# Patient Record
Sex: FEMALE | Race: WHITE | URBAN - METROPOLITAN AREA
[De-identification: names, ages, dates, MRNs, and addresses within clinical notes are randomized per-mention and may not be internally consistent; named-entity substitution may affect disease eponyms.]

---

## 2021-09-02 ENCOUNTER — LAB OUTSIDE AN ENCOUNTER (OUTPATIENT)
Dept: URBAN - METROPOLITAN AREA CLINIC 23 | Facility: CLINIC | Age: 26
End: 2021-09-02

## 2021-09-02 ENCOUNTER — OFFICE VISIT (OUTPATIENT)
Dept: URBAN - METROPOLITAN AREA CLINIC 23 | Facility: CLINIC | Age: 26
End: 2021-09-02
Payer: COMMERCIAL

## 2021-09-02 ENCOUNTER — WEB ENCOUNTER (OUTPATIENT)
Dept: URBAN - METROPOLITAN AREA CLINIC 23 | Facility: CLINIC | Age: 26
End: 2021-09-02

## 2021-09-02 VITALS
SYSTOLIC BLOOD PRESSURE: 121 MMHG | DIASTOLIC BLOOD PRESSURE: 66 MMHG | WEIGHT: 143 LBS | TEMPERATURE: 97.5 F | HEART RATE: 70 BPM | HEIGHT: 63 IN | BODY MASS INDEX: 25.34 KG/M2

## 2021-09-02 DIAGNOSIS — R19.7 DIARRHEA: ICD-10-CM

## 2021-09-02 DIAGNOSIS — R10.13 DYSPEPSIA: ICD-10-CM

## 2021-09-02 DIAGNOSIS — R11.2 NAUSEA AND VOMITING: ICD-10-CM

## 2021-09-02 DIAGNOSIS — R13.10 DYSPHAGIA: ICD-10-CM

## 2021-09-02 DIAGNOSIS — R14.0 ABDOMINAL BLOATING: ICD-10-CM

## 2021-09-02 DIAGNOSIS — R10.10 UPPER ABDOMINAL PAIN: ICD-10-CM

## 2021-09-02 DIAGNOSIS — K62.5 RECTAL BLEEDING: ICD-10-CM

## 2021-09-02 PROCEDURE — 99244 OFF/OP CNSLTJ NEW/EST MOD 40: CPT | Performed by: INTERNAL MEDICINE

## 2021-09-02 RX ORDER — ONDANSETRON 4 MG/1
1 TABLET ON THE TONGUE AND ALLOW TO DISSOLVE TABLET, ORALLY DISINTEGRATING ORAL ONCE A DAY
Status: ACTIVE | COMMUNITY

## 2021-09-02 RX ORDER — OMEPRAZOLE 40 MG/1
1 CAPSULE 30 MINUTES BEFORE MORNING MEAL CAPSULE, DELAYED RELEASE ORAL ONCE A DAY
Status: ACTIVE | COMMUNITY

## 2021-09-02 NOTE — HPI-TODAY'S VISIT:
- 24 yo  female, referred by Dr. Re Sabillon for further evaluation of GI complaints as detailed below, which have been ongoing for approximately 3 months.  A copy of this note will be sent to the referring physician. - Epigastric pain which is constant, varies between a pressure sensation and stabbing, and radiates to her back straight through.  Most of the day it is 3/10 in intensity but may worsen to 6/10 in intensity.  The pain is worsened after eating; it feels like the inside of her stomach is trying to eat itself.   - Associated symptoms include nausea and vomiting every 3 days 2-3 times on the day that she gets the symptoms.  Also intermittent dyspepsia, dysphagia, and abdominal bloating.  Has been taking PRN Zofran which helps.  Has also been taking omeprazole 40 mg daily since last week, which is helping. - Does not take ASA or NSAIDs - Has constipation alternating with diarrhea.  Constipation is more predominant; diarrhea only 1-2 days per week.  Rare rectal bleeding about once a month, with blood staining the toilet paper. - Had negative bloodwork done at PCPs office last month.  States her PCP ordered a RUQ ultrasound and she is scheduled to get it done next week. - Denies family history of GI malignancies in any first degree relatives

## 2021-09-13 PROBLEM — 40739000 DYSPHAGIA: Status: ACTIVE | Noted: 2021-09-02

## 2021-09-24 ENCOUNTER — OFFICE VISIT (OUTPATIENT)
Dept: URBAN - METROPOLITAN AREA SURGERY CENTER 15 | Facility: SURGERY CENTER | Age: 26
End: 2021-09-24
Payer: COMMERCIAL

## 2021-09-24 ENCOUNTER — CLAIMS CREATED FROM THE CLAIM WINDOW (OUTPATIENT)
Dept: URBAN - METROPOLITAN AREA CLINIC 4 | Facility: CLINIC | Age: 26
End: 2021-09-24
Payer: COMMERCIAL

## 2021-09-24 DIAGNOSIS — R19.7 ACUTE DIARRHEA: ICD-10-CM

## 2021-09-24 DIAGNOSIS — K29.60 OTHER GASTRITIS WITHOUT BLEEDING: ICD-10-CM

## 2021-09-24 DIAGNOSIS — K62.5 ANAL BLEEDING: ICD-10-CM

## 2021-09-24 DIAGNOSIS — K29.60 ADENOPAPILLOMATOSIS GASTRICA: ICD-10-CM

## 2021-09-24 DIAGNOSIS — K63.89 POLYP, HYPERPLASTIC: ICD-10-CM

## 2021-09-24 DIAGNOSIS — B96.81 BACTERIAL INFECTION DUE TO H. PYLORI: ICD-10-CM

## 2021-09-24 DIAGNOSIS — B96.81 HELICOBACTER PYLORI [H. PYLORI] AS THE CAUSE OF DISEASES CLASSIFIED ELSEWHERE: ICD-10-CM

## 2021-09-24 DIAGNOSIS — K21.9 ACID REFLUX: ICD-10-CM

## 2021-09-24 DIAGNOSIS — K21.9 GASTRO-ESOPHAGEAL REFLUX DISEASE WITHOUT ESOPHAGITIS: ICD-10-CM

## 2021-09-24 DIAGNOSIS — K31.89 NONSURGICAL DUMPING SYNDROME: ICD-10-CM

## 2021-09-24 PROCEDURE — G8907 PT DOC NO EVENTS ON DISCHARG: HCPCS | Performed by: INTERNAL MEDICINE

## 2021-09-24 PROCEDURE — 88342 IMHCHEM/IMCYTCHM 1ST ANTB: CPT | Performed by: PATHOLOGY

## 2021-09-24 PROCEDURE — 88312 SPECIAL STAINS GROUP 1: CPT | Performed by: PATHOLOGY

## 2021-09-24 PROCEDURE — 88305 TISSUE EXAM BY PATHOLOGIST: CPT | Performed by: PATHOLOGY

## 2021-09-24 PROCEDURE — 45380 COLONOSCOPY AND BIOPSY: CPT | Performed by: INTERNAL MEDICINE

## 2021-09-24 PROCEDURE — 43239 EGD BIOPSY SINGLE/MULTIPLE: CPT | Performed by: INTERNAL MEDICINE

## 2021-09-24 RX ORDER — OMEPRAZOLE 40 MG/1
1 CAPSULE 30 MINUTES BEFORE MORNING MEAL CAPSULE, DELAYED RELEASE ORAL ONCE A DAY
Status: ACTIVE | COMMUNITY

## 2021-09-24 RX ORDER — ONDANSETRON 4 MG/1
1 TABLET ON THE TONGUE AND ALLOW TO DISSOLVE TABLET, ORALLY DISINTEGRATING ORAL ONCE A DAY
Status: ACTIVE | COMMUNITY

## 2021-10-07 ENCOUNTER — OFFICE VISIT (OUTPATIENT)
Dept: URBAN - METROPOLITAN AREA CLINIC 33 | Facility: CLINIC | Age: 26
End: 2021-10-07

## 2021-10-07 NOTE — HPI-MIGRATED HPI
;     Vomitting : Patient presents today for consultation of severe weight loss and vomiting.  Onset was __ and course has been persistent.  Patient describes their -- .    Patient admits/denies any recent labs, scans, or other examinations.;

## 2021-10-17 ENCOUNTER — TELEPHONE ENCOUNTER (OUTPATIENT)
Dept: URBAN - METROPOLITAN AREA CLINIC 92 | Facility: CLINIC | Age: 26
End: 2021-10-17

## 2021-10-17 RX ORDER — AMOXICILLIN 500 MG/1
2 TABLETS TABLET, FILM COATED ORAL TWICE A DAY
Qty: 56 TABLETS | Refills: 0 | OUTPATIENT
Start: 2021-10-17 | End: 2021-10-31

## 2021-10-17 RX ORDER — CLARITHROMYCIN 500 MG/1
1 TABLET TABLET, FILM COATED ORAL
Qty: 28 TABLETS | Refills: 0 | OUTPATIENT
Start: 2021-10-17 | End: 2021-10-31

## 2021-10-17 RX ORDER — OMEPRAZOLE 40 MG/1
1 CAPSULE 30 MINUTES BEFORE MORNING MEAL CAPSULE, DELAYED RELEASE ORAL ONCE A DAY
OUTPATIENT

## 2021-10-17 RX ORDER — OMEPRAZOLE 20 MG/1
1 CAPSULE 30 MINUTES BEFORE BREAKFAST AND DINNER CAPSULE, DELAYED RELEASE ORAL TWICE A DAY
Qty: 28 | Refills: 0 | OUTPATIENT
Start: 2021-10-17

## 2021-12-16 ENCOUNTER — DASHBOARD ENCOUNTERS (OUTPATIENT)
Age: 26
End: 2021-12-16

## 2021-12-16 ENCOUNTER — OFFICE VISIT (OUTPATIENT)
Dept: URBAN - METROPOLITAN AREA CLINIC 23 | Facility: CLINIC | Age: 26
End: 2021-12-16
Payer: COMMERCIAL

## 2021-12-16 VITALS
DIASTOLIC BLOOD PRESSURE: 63 MMHG | SYSTOLIC BLOOD PRESSURE: 117 MMHG | WEIGHT: 130.8 LBS | BODY MASS INDEX: 23.18 KG/M2 | TEMPERATURE: 97.2 F | HEIGHT: 63 IN | HEART RATE: 66 BPM

## 2021-12-16 DIAGNOSIS — R11.0 NAUSEA: ICD-10-CM

## 2021-12-16 DIAGNOSIS — Z86.19 HISTORY OF HELICOBACTER PYLORI INFECTION: ICD-10-CM

## 2021-12-16 DIAGNOSIS — R10.12 LUQ ABDOMINAL PAIN: ICD-10-CM

## 2021-12-16 PROCEDURE — 99214 OFFICE O/P EST MOD 30 MIN: CPT | Performed by: INTERNAL MEDICINE

## 2021-12-16 RX ORDER — OMEPRAZOLE 20 MG/1
1 CAPSULE 30 MINUTES BEFORE BREAKFAST AND DINNER CAPSULE, DELAYED RELEASE ORAL TWICE A DAY
Qty: 28 | Refills: 0 | Status: ACTIVE | COMMUNITY
Start: 2021-10-17

## 2021-12-16 RX ORDER — ONDANSETRON 4 MG/1
1 TABLET ON THE TONGUE AND ALLOW TO DISSOLVE TABLET, ORALLY DISINTEGRATING ORAL ONCE A DAY
Status: ACTIVE | COMMUNITY

## 2021-12-16 NOTE — HPI-TODAY'S VISIT:
- 27 yo  female who returns for follow-up  - In 9/2021 I treated the patient with triple therapy for H. pylori gastritis diagnosed by EGD.  She had  a negative urease breath test done earlier this month at PCPs office, confirming eradication of H. pylori. - Her GI symptoms are better but she still has some lingering symptoms, namely nausea and LUQ discomfort.  She is taking Zofran about twice a week.  Her LUQ discomfort is constant but mild, 4/10 in intensity.  Feels like a tightness, like a pulled muscle.  She wonders if she may have pulled a muscle when she was previously throwing up a lot. - States she does not use marijuana nor consume any cannabinoid products - Patient had a negative abdominal ultrasound done through PCPs office in 9/2021.  Had negative bloodwork done at PCPs office earlier this month. - I have discussed with the patient the findings of previous endoscopic procedures and pathology results.  All questions were answered to their satisfaction.